# Patient Record
Sex: MALE | Race: OTHER | NOT HISPANIC OR LATINO
[De-identification: names, ages, dates, MRNs, and addresses within clinical notes are randomized per-mention and may not be internally consistent; named-entity substitution may affect disease eponyms.]

---

## 2023-01-24 ENCOUNTER — APPOINTMENT (OUTPATIENT)
Dept: UROLOGY | Facility: CLINIC | Age: 43
End: 2023-01-24
Payer: COMMERCIAL

## 2023-01-24 VITALS
HEART RATE: 90 BPM | WEIGHT: 182.98 LBS | OXYGEN SATURATION: 98 % | BODY MASS INDEX: 27.1 KG/M2 | DIASTOLIC BLOOD PRESSURE: 95 MMHG | SYSTOLIC BLOOD PRESSURE: 137 MMHG | HEIGHT: 68.9 IN | TEMPERATURE: 97 F

## 2023-01-24 DIAGNOSIS — M54.9 DORSALGIA, UNSPECIFIED: ICD-10-CM

## 2023-01-24 DIAGNOSIS — Z78.9 OTHER SPECIFIED HEALTH STATUS: ICD-10-CM

## 2023-01-24 DIAGNOSIS — N41.9 INFLAMMATORY DISEASE OF PROSTATE, UNSPECIFIED: ICD-10-CM

## 2023-01-24 PROBLEM — Z00.00 ENCOUNTER FOR PREVENTIVE HEALTH EXAMINATION: Status: ACTIVE | Noted: 2023-01-24

## 2023-01-24 PROCEDURE — 51798 US URINE CAPACITY MEASURE: CPT

## 2023-01-24 PROCEDURE — 99204 OFFICE O/P NEW MOD 45 MIN: CPT | Mod: 25

## 2023-01-24 RX ORDER — LEVOFLOXACIN 750 MG/1
TABLET, FILM COATED ORAL
Refills: 0 | Status: ACTIVE | COMMUNITY

## 2023-01-24 NOTE — ASSESSMENT
[FreeTextEntry1] : 42 year old man with recent episode of prostatitis, responding to treatment with levaquin. Resolution of urinary symptoms, but ongoing bilateral hip pain. I do not think the hip pain is related to prostatitis. I recommended orthopedic evaluation. He should complete antibiotic course and consider taking flomax to help with current infection and prevent infections in the future. He does not have paritcularly bothersome symptoms at baseline, so he is hesitant to take flomax in the long term. \par  - Complete levaquin course for at least 14 days total\par  - Flomax x 30 days\par  - F/U UA, UCx\par  - F/U PSA

## 2023-01-24 NOTE — LETTER BODY
[Dear  ___] : Dear  [unfilled], [Courtesy Letter:] : I had the pleasure of seeing your patient, [unfilled], in my office today. [Please see my note below.] : Please see my note below. [Consult Closing:] : Thank you very much for allowing me to participate in the care of this patient.  If you have any questions, please do not hesitate to contact me. [Sincerely,] : Sincerely, [FreeTextEntry3] : Aspen Adams MD

## 2023-01-24 NOTE — HISTORY OF PRESENT ILLNESS
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 \par \par He developed frequency, urgency, dysuria and pelvic pain. He had a urine culture that patient believes came back positive. He was treated with levaquin and received a 20 day prescription total. He was also prescribed flomax, but has not taken. He has taken ~10 days of levaquin so far and his urinary symptoms have improved significantly. No fevers, chills, dysuria, hematuria, frequency, urgency, perineal pain. He has ongoing bilateral hip pain that is worse when sitting for long periods of time, better when lying down. \par \par PVR 0 cc

## 2023-01-26 ENCOUNTER — NON-APPOINTMENT (OUTPATIENT)
Age: 43
End: 2023-01-26

## 2023-01-26 LAB
APPEARANCE: CLEAR
BACTERIA UR CULT: NORMAL
BACTERIA: NEGATIVE
BILIRUBIN URINE: NEGATIVE
BLOOD URINE: NEGATIVE
COLOR: YELLOW
GLUCOSE QUALITATIVE U: NEGATIVE
HYALINE CASTS: 0 /LPF
KETONES URINE: NEGATIVE
LEUKOCYTE ESTERASE URINE: NEGATIVE
MICROSCOPIC-UA: NORMAL
NITRITE URINE: NEGATIVE
PH URINE: 6.5
PROTEIN URINE: NORMAL
PSA FREE FLD-MCNC: 31 %
PSA FREE SERPL-MCNC: 0.26 NG/ML
PSA SERPL-MCNC: 0.82 NG/ML
RED BLOOD CELLS URINE: 1 /HPF
SPECIFIC GRAVITY URINE: 1.03
SQUAMOUS EPITHELIAL CELLS: 0 /HPF
UROBILINOGEN URINE: NORMAL
WHITE BLOOD CELLS URINE: 0 /HPF

## 2023-03-01 ENCOUNTER — APPOINTMENT (OUTPATIENT)
Dept: UROLOGY | Facility: CLINIC | Age: 43
End: 2023-03-01

## 2023-03-01 NOTE — PHYSICAL EXAM
[General Appearance - Well Developed] : well developed [General Appearance - Well Nourished] : well nourished [Normal Appearance] : normal appearance [Well Groomed] : well groomed [General Appearance - In No Acute Distress] : no acute distress [Abdomen Soft] : soft [Abdomen Tenderness] : non-tender [Costovertebral Angle Tenderness] : no ~M costovertebral angle tenderness [Urethral Meatus] : meatus normal [Urinary Bladder Findings] : the bladder was normal on palpation [Scrotum] : the scrotum was normal [Testes Mass (___cm)] : there were no testicular masses [No Prostate Nodules] : no prostate nodules [Prostate Size ___ gm] : prostate size [unfilled] gm [Edema] : no peripheral edema [] : no respiratory distress [Respiration, Rhythm And Depth] : normal respiratory rhythm and effort [Exaggerated Use Of Accessory Muscles For Inspiration] : no accessory muscle use [Oriented To Time, Place, And Person] : oriented to person, place, and time [Affect] : the affect was normal [Mood] : the mood was normal [Not Anxious] : not anxious [Normal Station and Gait] : the gait and station were normal for the patient's age [No Focal Deficits] : no focal deficits

## 2023-03-01 NOTE — HISTORY OF PRESENT ILLNESS
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 \par \par He developed frequency, urgency, dysuria and pelvic pain. He had a urine culture that patient believes came back positive. He was treated with levaquin and received a 20 day prescription total. He was also prescribed flomax, but has not taken. He has taken ~10 days of levaquin so far and his urinary symptoms have improved significantly. No fevers, chills, dysuria, hematuria, frequency, urgency, perineal pain. He has ongoing bilateral hip pain that is worse when sitting for long periods of time, better when lying down. \par \par PVR 0 cc \par \par

## 2023-03-08 ENCOUNTER — APPOINTMENT (OUTPATIENT)
Dept: UROLOGY | Facility: CLINIC | Age: 43
End: 2023-03-08